# Patient Record
Sex: FEMALE | URBAN - METROPOLITAN AREA
[De-identification: names, ages, dates, MRNs, and addresses within clinical notes are randomized per-mention and may not be internally consistent; named-entity substitution may affect disease eponyms.]

---

## 2020-12-11 ENCOUNTER — APPOINTMENT (RX ONLY)
Dept: URBAN - METROPOLITAN AREA CLINIC 12 | Facility: CLINIC | Age: 50
Setting detail: DERMATOLOGY
End: 2020-12-11

## 2020-12-11 DIAGNOSIS — L81.0 POSTINFLAMMATORY HYPERPIGMENTATION: ICD-10-CM

## 2020-12-11 DIAGNOSIS — L71.0 PERIORAL DERMATITIS: ICD-10-CM

## 2020-12-11 PROCEDURE — ? COUNSELING

## 2020-12-11 PROCEDURE — 99202 OFFICE O/P NEW SF 15 MIN: CPT

## 2020-12-11 PROCEDURE — ? PRESCRIPTION

## 2020-12-11 RX ORDER — MINOCYCLINE HYDROCHLORIDE 100 MG/1
CAPSULE ORAL
Qty: 60 | Refills: 0 | Status: ERX | COMMUNITY
Start: 2020-12-11

## 2020-12-11 RX ORDER — METRONIDAZOLE 7.5 MG/G
GEL TOPICAL
Qty: 1 | Refills: 0 | Status: ERX | COMMUNITY
Start: 2020-12-11

## 2020-12-11 RX ADMIN — MINOCYCLINE HYDROCHLORIDE: 100 CAPSULE ORAL at 00:00

## 2020-12-11 RX ADMIN — METRONIDAZOLE: 7.5 GEL TOPICAL at 00:00

## 2020-12-11 ASSESSMENT — LOCATION DETAILED DESCRIPTION DERM
LOCATION DETAILED: LEFT CHIN
LOCATION DETAILED: LEFT LOWER CUTANEOUS LIP
LOCATION DETAILED: LEFT CENTRAL EYEBROW
LOCATION DETAILED: RIGHT MEDIAL BUCCAL CHEEK
LOCATION DETAILED: LEFT SUPERIOR FOREHEAD
LOCATION DETAILED: RIGHT RADIAL DORSAL HAND

## 2020-12-11 ASSESSMENT — LOCATION SIMPLE DESCRIPTION DERM
LOCATION SIMPLE: LEFT LIP
LOCATION SIMPLE: RIGHT HAND
LOCATION SIMPLE: LEFT FOREHEAD
LOCATION SIMPLE: LEFT EYEBROW
LOCATION SIMPLE: CHIN
LOCATION SIMPLE: RIGHT CHEEK

## 2020-12-11 ASSESSMENT — LOCATION ZONE DERM
LOCATION ZONE: HAND
LOCATION ZONE: LIP
LOCATION ZONE: FACE

## 2020-12-11 NOTE — PROCEDURE: COUNSELING
Detail Level: Detailed
Patient Specific Counseling (Will Not Stick From Patient To Patient): et?  all non specific in appearance, random.  not concerning today, encouraged gentle care.

## 2020-12-11 NOTE — HPI: DRY SKIN
How Severe Is Your Dry Skin?: moderate
How Many Showers Or Baths Do You Take In One Day?: 2
Additional History: Declines fbe.\\n\\nPt states “leaving welts or bleeds sometimes”

## 2024-06-27 ENCOUNTER — APPOINTMENT (RX ONLY)
Dept: URBAN - METROPOLITAN AREA CLINIC 12 | Facility: CLINIC | Age: 54
Setting detail: DERMATOLOGY
End: 2024-06-27

## 2024-06-27 DIAGNOSIS — L30.9 DERMATITIS, UNSPECIFIED: ICD-10-CM | Status: STABLE

## 2024-06-27 DIAGNOSIS — K13.0 DISEASES OF LIPS: ICD-10-CM | Status: INADEQUATELY CONTROLLED

## 2024-06-27 PROCEDURE — ? OTHER

## 2024-06-27 PROCEDURE — ? COUNSELING

## 2024-06-27 PROCEDURE — 99203 OFFICE O/P NEW LOW 30 MIN: CPT

## 2024-06-27 PROCEDURE — ? PRESCRIPTION

## 2024-06-27 PROCEDURE — ? DIAGNOSIS COMMENT

## 2024-06-27 PROCEDURE — ? PRESCRIPTION MEDICATION MANAGEMENT

## 2024-06-27 RX ORDER — KETOCONAZOLE 20 MG/G
CREAM TOPICAL BID
Qty: 30 | Refills: 3 | Status: ERX | COMMUNITY
Start: 2024-06-27

## 2024-06-27 RX ADMIN — KETOCONAZOLE: 20 CREAM TOPICAL at 00:00

## 2024-06-27 ASSESSMENT — LOCATION ZONE DERM: LOCATION ZONE: LIP

## 2024-06-27 ASSESSMENT — LOCATION SIMPLE DESCRIPTION DERM: LOCATION SIMPLE: RIGHT LOWER LIP

## 2024-06-27 ASSESSMENT — LOCATION DETAILED DESCRIPTION DERM: LOCATION DETAILED: RIGHT INFERIOR VERMILION BORDER

## 2024-06-27 NOTE — PROCEDURE: COUNSELING
Patient Specific Counseling (Will Not Stick From Patient To Patient): [place] [facility].
Detail Level: Detailed

## 2024-06-27 NOTE — PROCEDURE: OTHER
Detail Level: Zone
Render Risk Assessment In Note?: no
Other (Free Text): Referred by pcp for biopsy to r/o sarcoidosis.  \\nPt is concerned about areas of hyperpigmentation on face and extremities however brought photos of lesions on face that are of nonhealing, ulcerated wounds (none present today).  She states she has gotten these under her chin, on her face, on her legs and gets them in her groin.  The images appear more consistent with traumatized acniform papule.  The areas of hyperpigmentation of vague.  \\n\\nShe denies treatment other than “topical creams” that were not effective.  \\n\\nPMHx is significant for sickle cell anemia.\\nDenies lumps or nodules under the skin \\nReports “allergy” at dentist with dental work including racing heartbeat
Note Text (......Xxx Chief Complaint.): This diagnosis correlates with the

## 2024-06-27 NOTE — PROCEDURE: DIAGNOSIS COMMENT
Render Risk Assessment In Note?: no
Detail Level: Simple
Comment: No lesions present on exam today.  Pt brought photos showing several areas of inflamed papules/nodules with ulceration.

## 2024-06-27 NOTE — PROCEDURE: PRESCRIPTION MEDICATION MANAGEMENT
Plan: Advised pt that without any active lesions a biopsy today would not be helpful.  Advised pt to contact office with new lesions for further evaluation and possible biopsy.  \\n\\nGiven the lesions photographed are suggestive of acne excoriee/neurotic excoriations, could consider trial of oral antibiotic and wound care.
Detail Level: Zone
Render In Strict Bullet Format?: No
Initiate Treatment: Keto cream bid to lesion/fissure at corner of lip. \\nAquaphor nightly to lips and corners of mouth.

## 2024-06-27 NOTE — HPI: RASH
How Severe Is Your Rash?: moderate
Is This A New Presentation, Or A Follow-Up?: Rash
Additional History: Pt has concern about rash, mostly on face legs and has been on hands. Referral from Dr. Glynn r/o sarcoidosis but wants us to take biopsy. Chaperone is Jaquail